# Patient Record
Sex: FEMALE | URBAN - METROPOLITAN AREA
[De-identification: names, ages, dates, MRNs, and addresses within clinical notes are randomized per-mention and may not be internally consistent; named-entity substitution may affect disease eponyms.]

---

## 2023-11-03 ENCOUNTER — OFFICE VISIT (OUTPATIENT)
Dept: URBAN - METROPOLITAN AREA CLINIC 19 | Facility: CLINIC | Age: 48
End: 2023-11-03

## 2023-11-10 ENCOUNTER — DASHBOARD ENCOUNTERS (OUTPATIENT)
Age: 48
End: 2023-11-10

## 2023-11-20 ENCOUNTER — LAB OUTSIDE AN ENCOUNTER (OUTPATIENT)
Dept: URBAN - METROPOLITAN AREA CLINIC 19 | Facility: CLINIC | Age: 48
End: 2023-11-20

## 2023-11-20 ENCOUNTER — OFFICE VISIT (OUTPATIENT)
Dept: URBAN - METROPOLITAN AREA CLINIC 19 | Facility: CLINIC | Age: 48
End: 2023-11-20
Payer: COMMERCIAL

## 2023-11-20 VITALS
WEIGHT: 114.2 LBS | SYSTOLIC BLOOD PRESSURE: 110 MMHG | DIASTOLIC BLOOD PRESSURE: 70 MMHG | BODY MASS INDEX: 20.23 KG/M2 | HEIGHT: 63 IN

## 2023-11-20 DIAGNOSIS — Z12.11 SCREEN FOR COLON CANCER: ICD-10-CM

## 2023-11-20 PROCEDURE — 99203 OFFICE O/P NEW LOW 30 MIN: CPT | Performed by: NURSE PRACTITIONER

## 2023-11-20 RX ORDER — FLUTICASONE PROPIONATE 50 UG/1
1 SPRAY IN EACH NOSTRIL SPRAY, METERED NASAL ONCE A DAY
Status: ACTIVE | COMMUNITY

## 2023-11-20 RX ORDER — OMEPRAZOLE 40 MG/1
1 CAPSULE 30 MINUTES BEFORE MORNING MEAL CAPSULE, DELAYED RELEASE ORAL ONCE A DAY
Status: ACTIVE | COMMUNITY

## 2023-11-20 NOTE — HPI-TODAY'S VISIT:
Ms. Hood Puri is a 49 yo female who presents today for screening colonoscopy. Sent upon referral from Dr. Ames with [facility] [place]. A copy of this report will be sent to the referring provider.  Visit conducted with .  She has never had a colonoscopy. She denies having any family history of colon cancer. She reports abdominal swelling/bloating sometimes to the point she looks pregnant. Ongoing for the past year. Occurs about twice a day. No pain.  She reports having a BM everyday, only occasional constipation. Does not take anything for this, will eat soft foods and then BMs will go back to normal. No pain with BM. No bloody or black stools.     No cardio/pulm/kidney disease. Not on any blood thinners. No NSAID use.

## 2024-02-14 ENCOUNTER — COLON (OUTPATIENT)
Dept: URBAN - METROPOLITAN AREA LAB 2 | Facility: LAB | Age: 49
End: 2024-02-14

## 2024-05-08 ENCOUNTER — OFFICE VISIT (OUTPATIENT)
Dept: URBAN - METROPOLITAN AREA LAB 2 | Facility: LAB | Age: 49
End: 2024-05-08
Payer: COMMERCIAL

## 2024-05-08 DIAGNOSIS — D12.3 ADENOMA OF TRANSVERSE COLON: ICD-10-CM

## 2024-05-08 DIAGNOSIS — Z12.11 COLON CANCER SCREENING: ICD-10-CM

## 2024-05-08 PROCEDURE — 45380 COLONOSCOPY AND BIOPSY: CPT | Performed by: INTERNAL MEDICINE

## 2024-05-08 RX ORDER — FLUTICASONE PROPIONATE 50 UG/1
1 SPRAY IN EACH NOSTRIL SPRAY, METERED NASAL ONCE A DAY
Status: ACTIVE | COMMUNITY

## 2024-05-08 RX ORDER — OMEPRAZOLE 40 MG/1
1 CAPSULE 30 MINUTES BEFORE MORNING MEAL CAPSULE, DELAYED RELEASE ORAL ONCE A DAY
Status: ACTIVE | COMMUNITY

## 2024-06-27 ENCOUNTER — OFFICE VISIT (OUTPATIENT)
Dept: URBAN - METROPOLITAN AREA CLINIC 19 | Facility: CLINIC | Age: 49
End: 2024-06-27
Payer: COMMERCIAL

## 2024-06-27 VITALS
SYSTOLIC BLOOD PRESSURE: 102 MMHG | WEIGHT: 108.4 LBS | HEIGHT: 63 IN | HEART RATE: 80 BPM | DIASTOLIC BLOOD PRESSURE: 60 MMHG | BODY MASS INDEX: 19.21 KG/M2

## 2024-06-27 DIAGNOSIS — R10.11 RUQ PAIN: ICD-10-CM

## 2024-06-27 DIAGNOSIS — R14.2 BELCHING: ICD-10-CM

## 2024-06-27 DIAGNOSIS — R10.13 EPIGASTRIC PAIN: ICD-10-CM

## 2024-06-27 PROCEDURE — 99213 OFFICE O/P EST LOW 20 MIN: CPT | Performed by: NURSE PRACTITIONER

## 2024-06-27 RX ORDER — FLUTICASONE PROPIONATE 50 UG/1
1 SPRAY IN EACH NOSTRIL SPRAY, METERED NASAL ONCE A DAY
Status: ACTIVE | COMMUNITY

## 2024-06-27 NOTE — HPI-TODAY'S VISIT:
11/20/2023 (Melida,VALERIE):  Ms. Hood Puri is a 49 yo female who presents today for screening colonoscopy. Sent upon referral from Dr. Ames with [facility]. A copy of this report will be sent to the referring provider. Visit conducted with . She has never had a colonoscopy. She denies having any family history of colon cancer. She reports abdominal swelling/bloating sometimes to the point she looks pregnant. Ongoing for the past year. Occurs about twice a day. No pain. She reports having a BM everyday, only occasional constipation. Does not take anything for this, will eat soft foods and then BMs will go back to normal. No pain with BM. No bloody or black stools.  No cardio/pulm/kidney disease. Not on any blood thinners. No NSAID use.    5/8/2024 Colonoscopy with Dr. Javed found one diminutive (TA) polyp in the transverse colon and diverticulosis.  5-year f/u given.   6/27/2024 (Melida, VALERIE): Here today for c/o pain, onset 2 weeks ago, starts right groin and radiates to RUQ which radiates to her back. Then has epigastric pressure afterwards. Feels like a ball that moves. Saw her PCP who ordered an US last week, does not know results. Labs and UA normal. Was on Omeprazole which started 6 mo ago. Stopped it 2 weeks ago. Reports indigestion and constipation. A lot of belching and bloating, difficult time getting gas out. No NSAID use. No alcohol use. Started taking miralax every day which is helping.

## 2024-07-22 ENCOUNTER — OFFICE VISIT (OUTPATIENT)
Dept: URBAN - METROPOLITAN AREA CLINIC 19 | Facility: CLINIC | Age: 49
End: 2024-07-22
Payer: COMMERCIAL

## 2024-07-22 VITALS
HEIGHT: 63 IN | TEMPERATURE: 97.9 F | WEIGHT: 106.6 LBS | HEART RATE: 82 BPM | SYSTOLIC BLOOD PRESSURE: 126 MMHG | BODY MASS INDEX: 18.89 KG/M2 | DIASTOLIC BLOOD PRESSURE: 80 MMHG

## 2024-07-22 DIAGNOSIS — R14.2 BELCHING: ICD-10-CM

## 2024-07-22 DIAGNOSIS — R10.11 RUQ PAIN: ICD-10-CM

## 2024-07-22 DIAGNOSIS — R10.13 EPIGASTRIC PAIN: ICD-10-CM

## 2024-07-22 PROCEDURE — 99213 OFFICE O/P EST LOW 20 MIN: CPT | Performed by: NURSE PRACTITIONER

## 2024-07-22 NOTE — PHYSICAL EXAM GASTROINTESTINAL
Abdomen , soft, nontender, nondistended , no guarding or rigidity Liver and Spleen,  no hepatosplenomegaly

## 2024-07-22 NOTE — HPI-TODAY'S VISIT:
11/20/2023 (Melida,VALERIE): Ms. Hood Puri is a 47 yo female who presents today for screening colonoscopy. Sent upon referral from Dr. Ames with [facility] [place]. A copy of this report will be sent to the referring provider. Visit conducted with . She has never had a colonoscopy. She denies having any family history of colon cancer. She reports abdominal swelling/bloating sometimes to the point she looks pregnant. Ongoing for the past year. Occurs about twice a day. No pain. She reports having a BM everyday, only occasional constipation. Does not take anything for this, will eat soft foods and then BMs will go back to normal. No pain with BM. No bloody or black stools.  No cardio/pulm/kidney disease. Not on any blood thinners. No NSAID use.    5/8/2024 Colonoscopy with Dr. Javed found one diminutive (TA) polyp in the transverse colon and diverticulosis. 5-year f/u given.   6/27/2024 (VALERIE Montez): Here today for c/o pain, onset 2 weeks ago, starts right groin and radiates to RUQ which radiates to her back. Then has epigastric pressure afterwards. Feels like a ball that moves. Saw her PCP who ordered an US last week, does not know results. Labs and UA normal. Was on Omeprazole which started 6 mo ago. Stopped it 2 weeks ago. Reports indigestion and constipation. A lot of belching and bloating, difficult time getting gas out. No NSAID use. No alcohol use. Started taking miralax every day which is helping.    7/22/2024 (VALERIE Montez): Here for follow-up. Last visit, H pylori breath test was negative. She resumed Omeprazole daily Still with RUQ and epigastric pain and belching even if she does not eat anything. Burping worse at night, has to stand up to try to get the air out. Tylenol or drinking tea helps. Bending over makes it worse but pain can be at anytime, even just sitting.  Has dark green stools and constipation with taking oral iron. She had an abdominal US on 6/19/2024 that showed no abdominal abnormalities, possible thickening of urinary bladder wall.  Liver looked normal, no masses. No ductal dilation.  Gallbladder was normal, no gallstones and no signs of pancreatitis.

## 2025-06-02 ENCOUNTER — TELEPHONE ENCOUNTER (OUTPATIENT)
Dept: URBAN - METROPOLITAN AREA CLINIC 19 | Facility: CLINIC | Age: 50
End: 2025-06-02

## 2025-06-06 ENCOUNTER — OFFICE VISIT (OUTPATIENT)
Dept: URBAN - METROPOLITAN AREA CLINIC 128 | Facility: CLINIC | Age: 50
End: 2025-06-06

## 2025-06-19 ENCOUNTER — LAB OUTSIDE AN ENCOUNTER (OUTPATIENT)
Dept: URBAN - METROPOLITAN AREA CLINIC 19 | Facility: CLINIC | Age: 50
End: 2025-06-19

## 2025-06-19 ENCOUNTER — OFFICE VISIT (OUTPATIENT)
Dept: URBAN - METROPOLITAN AREA CLINIC 19 | Facility: CLINIC | Age: 50
End: 2025-06-19
Payer: COMMERCIAL

## 2025-06-19 DIAGNOSIS — R10.13 EPIGASTRIC PAIN: ICD-10-CM

## 2025-06-19 DIAGNOSIS — K76.0 HEPATIC STEATOSIS: ICD-10-CM

## 2025-06-19 DIAGNOSIS — R10.11 RUQ PAIN: ICD-10-CM

## 2025-06-19 PROCEDURE — 99213 OFFICE O/P EST LOW 20 MIN: CPT

## 2025-06-19 NOTE — HPI-TODAY'S VISIT:
Mrs. Morataya is a 49-year-old female with history of prediabetes who returns today for reevaluation of her epigastric and RUQ pain. Last seen by VALERIE Montez on 7/22/2024 with epigastric pain, RUQ pain, belching and recommended an EGD that was not done due to financial reasons.  She describes her symptom as "pressure" and is it constant. Admits to having reflux based on certain food choices as well as bloating and belching. Denies nausea and vomiting.  Reports having daily BMs since hse started taking otc magnesium  Previous GI workup: 5/8/2024 Colonoscopy with Dr. Javed found one diminutive (TA) polyp in the transverse colon and diverticulosis. 5-year f/u given (2029). 6/19/2024 complete abdominal ultrasound noted no abdominal abnormalities.  Possible thickening posterior urinary bladder wall is difficult to evaluate because of incomplete bladder distention.  Limited transabdominal pelvic ultrasound with bladder distention is recommended. 6/19/2024 transvaginal ultrasound noted mild uterine enlargement.  Approximately 1.4 cm hypoechoic nodule anterior uterine fundus consistent with a leiomyoma.  Benign oval-shaped 2.0 cm right ovarian cyst. 10/14/2024 CT A/P with contrast noted hepatic steatosis, heterogeneous uterus with small masses likely from fibroids.  Evidence of cystocele.

## 2025-06-22 LAB
A/G RATIO: 1.4
ALBUMIN: 4.6
ALKALINE PHOSPHATASE: 85
ALT (SGPT): 16
AST (SGOT): 20
BILIRUBIN, TOTAL: 0.4
BUN/CREATININE RATIO: (no result)
BUN: 15
CALCIUM: 9.4
CARBON DIOXIDE, TOTAL: 29
CHLORIDE: 101
CREATININE: 0.59
EGFR: 109
ENHANCED LIVER FIBROSIS (ELF) SCORE: 8.83
GLOBULIN, TOTAL: 3.4
GLUCOSE: 86
INR: 1
POTASSIUM: 4.2
PROTEIN, TOTAL: 8
PT: 10.5
SODIUM: 139

## 2025-07-29 ENCOUNTER — OFFICE VISIT (OUTPATIENT)
Age: 50
End: 2025-07-29

## 2025-07-29 ENCOUNTER — TELEPHONE ENCOUNTER (OUTPATIENT)
Dept: URBAN - METROPOLITAN AREA CLINIC 19 | Facility: CLINIC | Age: 50
End: 2025-07-29

## 2025-08-17 ENCOUNTER — LAB OUTSIDE AN ENCOUNTER (OUTPATIENT)
Dept: URBAN - METROPOLITAN AREA CLINIC 19 | Facility: CLINIC | Age: 50
End: 2025-08-17

## 2025-08-18 ENCOUNTER — OFFICE VISIT (OUTPATIENT)
Dept: URBAN - METROPOLITAN AREA CLINIC 19 | Facility: CLINIC | Age: 50
End: 2025-08-18
Payer: COMMERCIAL

## 2025-08-18 DIAGNOSIS — K76.0 HEPATIC STEATOSIS: ICD-10-CM

## 2025-08-18 DIAGNOSIS — R10.11 RUQ PAIN: ICD-10-CM

## 2025-08-18 DIAGNOSIS — R10.13 EPIGASTRIC PAIN: ICD-10-CM

## 2025-08-18 DIAGNOSIS — K59.01 CONSTIPATION: ICD-10-CM

## 2025-08-18 PROCEDURE — 99213 OFFICE O/P EST LOW 20 MIN: CPT
